# Patient Record
Sex: FEMALE | Race: WHITE
[De-identification: names, ages, dates, MRNs, and addresses within clinical notes are randomized per-mention and may not be internally consistent; named-entity substitution may affect disease eponyms.]

---

## 2019-06-24 ENCOUNTER — HOSPITAL ENCOUNTER (INPATIENT)
Dept: HOSPITAL 41 - JD.SDS | Age: 59
LOS: 1 days | Discharge: HOME | DRG: 302 | End: 2019-06-25
Attending: ORTHOPAEDIC SURGERY | Admitting: ORTHOPAEDIC SURGERY
Payer: COMMERCIAL

## 2019-06-24 DIAGNOSIS — E78.5: ICD-10-CM

## 2019-06-24 DIAGNOSIS — E11.9: ICD-10-CM

## 2019-06-24 DIAGNOSIS — Z79.82: ICD-10-CM

## 2019-06-24 DIAGNOSIS — M17.0: Primary | ICD-10-CM

## 2019-06-24 DIAGNOSIS — E03.9: ICD-10-CM

## 2019-06-24 DIAGNOSIS — Z87.891: ICD-10-CM

## 2019-06-24 DIAGNOSIS — Z90.49: ICD-10-CM

## 2019-06-24 DIAGNOSIS — Z79.84: ICD-10-CM

## 2019-06-24 DIAGNOSIS — Z79.899: ICD-10-CM

## 2019-06-24 DIAGNOSIS — H54.7: ICD-10-CM

## 2019-06-24 PROCEDURE — 3E0U33Z INTRODUCTION OF ANTI-INFLAMMATORY INTO JOINTS, PERCUTANEOUS APPROACH: ICD-10-PCS | Performed by: ORTHOPAEDIC SURGERY

## 2019-06-24 PROCEDURE — 0SRD0JA REPLACEMENT OF LEFT KNEE JOINT WITH SYNTHETIC SUBSTITUTE, UNCEMENTED, OPEN APPROACH: ICD-10-PCS | Performed by: ORTHOPAEDIC SURGERY

## 2019-06-24 PROCEDURE — C1776 JOINT DEVICE (IMPLANTABLE): HCPCS

## 2019-06-24 RX ADMIN — IODINE ONE ML: 20; 20.4; 47 LIQUID TOPICAL at 10:48

## 2019-06-24 RX ADMIN — OXYCODONE HYDROCHLORIDE AND ACETAMINOPHEN PRN TAB: 5; 325 TABLET ORAL at 18:44

## 2019-06-24 RX ADMIN — CEFUROXIME ONE MG: 750 INJECTION, POWDER, FOR SOLUTION INTRAMUSCULAR; INTRAVENOUS at 10:48

## 2019-06-24 RX ADMIN — TRIAMCINOLONE ACETONIDE ONE MG: 40 INJECTION, SUSPENSION INTRA-ARTICULAR; INTRAMUSCULAR at 11:20

## 2019-06-24 RX ADMIN — IODINE ONE ML: 20; 20.4; 47 LIQUID TOPICAL at 10:57

## 2019-06-24 RX ADMIN — DEXTROSE ONE GM: 5 SOLUTION INTRAVENOUS at 11:06

## 2019-06-24 RX ADMIN — DEXTROSE ONE GM: 5 SOLUTION INTRAVENOUS at 10:50

## 2019-06-24 RX ADMIN — TRIAMCINOLONE ACETONIDE ONE MG: 40 INJECTION, SUSPENSION INTRA-ARTICULAR; INTRAMUSCULAR at 10:49

## 2019-06-24 RX ADMIN — CEFUROXIME ONE MG: 750 INJECTION, POWDER, FOR SOLUTION INTRAMUSCULAR; INTRAVENOUS at 11:04

## 2019-06-24 RX ADMIN — OXYCODONE HYDROCHLORIDE AND ACETAMINOPHEN PRN TAB: 5; 325 TABLET ORAL at 14:34

## 2019-06-24 NOTE — PCM.CONS
H&P History of Present Illness





- General


Date of Service: 06/24/19


Admit Problem/Dx: 


 Admission Diagnosis/Problem





Admission Diagnosis/Problem      Osteoarthritis of knee








Source of Information: Patient, Old Records, Provider, RN, RN Notes Reviewed


History Limitations: Reports: No Limitations





- History of Present Illness


Initial Comments - Free Text/Narative: 


Tuyet Griffith is a 59 yo female patient of Dr. Cedeño who is post-operative day 

0 of left TKA and right knee cortisone injection. Hospital medicine was 

consulted for post-operative medical care of the following listed medical 

conditions. At this time she is resting comfortably in bed Pain is controlled. 

She denies  any chest pain, shortness of breath, palpitations, nausea, or 

vomiting. She carries a history of: Impaired vision, HLD, Osteoarthritis, Type 

II DM, Hypothyroidism. .





She is a full code. Her primary care provider is Evelia Camarena PA-C.





  ** Left Knee


Pain Score (Numeric/FACES): 4





- Related Data


Allergies/Adverse Reactions: 


 Allergies











Allergy/AdvReac Type Severity Reaction Status Date / Time


 


No Known Allergies Allergy   Verified 06/24/19 13:00











Home Medications: 


 Home Meds





Acetaminophen/Diphenhydramine [Tylenol Pm Ex-Strength Caplet] 2 tab PO BEDTIME 

PRN 06/21/19 [History]


Aspirin [Halfprin] 81 mg PO DAILY 06/21/19 [History]


Ca Carbonate/Vitamin D3/Vit K [Calcium + D Soft Chewable Tab] 1 tab PO DAILY 06/ 21/19 [History]


Cholecalciferol (Vitamin D3) [Vitamin D3] 5,000 unit PO DAILY 06/21/19 [History]


Fish Oil/Omega-3 Fatty Acids [Fish Oil 1,000 MG] 1 gm PO BID 06/21/19 [History]


Levothyroxine 150 mcg PO DAILY 06/21/19 [History]


Multivitamin [Daily Multiple Vitamin] 1 tab PO DAILY 06/21/19 [History]


Simvastatin [Zocor] 40 mg PO DAILY 06/21/19 [History]


metFORMIN HCl [Metformin HCl ER] 500 mg PO DAILY 06/21/19 [History]











Past Medical History


HEENT History: Reports: Impaired Vision, Other (See Below)


Other HEENT History: wears glasses, has partial


Cardiovascular History: Reports: High Cholesterol


Respiratory History: Reports: None


Gastrointestinal History: Reports: None


Genitourinary History: Reports: None


OB/GYN History: Reports: None


Musculoskeletal History: Reports: Osteoarthritis


Neurological History: Reports: None


Psychiatric History: Reports: None


Endocrine/Metabolic History: Reports: Diabetes, Type II, Hypothyroidism


Hematologic History: Reports: None


Immunologic History: Reports: None


Oncologic (Cancer) History: Reports: None


Dermatologic History: Reports: None





- Infectious Disease History


Infectious Disease History: Reports: None





- Past Surgical History


Head Surgeries/Procedures: Reports: None


Cardiovascular Surgical History: Reports: None


Respiratory Surgical History: Reports: None


GI Surgical History: Reports: Cholecystectomy


Female  Surgical History: Reports: None


Male  Surgical History: Reports: None


Endocrine Surgical History: Reports: None


Neurological Surgical History: Reports: None


Musculoskeletal Surgical History: Reports: Shoulder Surgery, Other (See Below)


Other Musculoskeletal Surgeries/Procedures:: knee arthroscopy, decompression of 

medial nerve


Oncologic Surgical History: Reports: None


Dermatological Surgical History: Reports: None





Social & Family History





- Family History


Family Medical History: Noncontributory





- Tobacco Use


Smoking Status *Q: Former Smoker


Years of Tobacco use: 40


Used Tobacco, but Quit: Yes


Month/Year Tobacco Last Used: Jan 2017


Second Hand Smoke Exposure: No





- Caffeine Use


Caffeine Use: Reports: Coffee


Other Caffeine Use: 1 pot of coffee a day.





- Recreational Drug Use


Recreational Drug Use: No





H&P Review of Systems





- Review of Systems:


Review Of Systems: See Below


General: Reports: No Symptoms.  Denies: Fever, Chills


HEENT: Reports: No Symptoms.  Denies: Headaches, Sore Throat


Pulmonary: Reports: No Symptoms.  Denies: Shortness of Breath, Wheezing, 

Pleuritic Chest Pain, Cough, Sputum


Cardiovascular: Reports: No Symptoms.  Denies: Chest Pain, Palpitations, 

Dyspnea on Exertion


Gastrointestinal: Reports: No Symptoms.  Denies: Abdominal Pain, Constipation, 

Diarrhea, Nausea, Vomiting


Genitourinary: Reports: No Symptoms.  Denies: Pain


Musculoskeletal: Reports: Leg Pain


Skin: Reports: No Symptoms


Psychiatric: Reports: No Symptoms.  Denies: Confusion


Neurological: Reports: No Symptoms.  Denies: Pre-Existing Deficit


Hematologic/Lymphatic: Reports: No Symptoms


Immunologic: Reports: No Symptoms





Exam





- Exam


Exam: See Below





- Vital Signs


Vital Signs: 


 Last Vital Signs











Temp  97.2 F   06/24/19 12:15


 


Pulse  55 L  06/24/19 11:45


 


Resp  13   06/24/19 12:15


 


BP  129/82   06/24/19 12:15


 


Pulse Ox  98   06/24/19 12:15











Weight: 176 lb





- Exam


Quality Assessment: DVT Prophylaxis.  No: Supplemental Oxygen, Urinary Catheter


General: Alert, Oriented, Cooperative.  No: Mild Distress


HEENT: Conjunctiva Clear, EACs Clear, EOMI, Hearing Intact, Mucosa Moist & Pink

, Nares Patent, Posterior Pharynx Clear, PERRLA


Neck: Supple, Trachea Midline


Lungs: Clear to Auscultation, Normal Respiratory Effort


Cardiovascular: Regular Rate, Regular Rhythm


GI/Abdominal Exam: Normal Bowel Sounds, Soft, Non-Tender, No Distention, No 

Abnormal Bruit


 (Female) Exam: Deferred


Rectal (Female) Exam: Deferred


Back Exam: Normal Inspection, Full Range of Motion


Extremities: No Pedal Edema, Normal Capillary Refill, Leg Pain, Limited Range 

of Motion, Other (Bandage in place on left leg. Bandage is dry and intact. 

Cooling pack in place. )


Peripheral Pulses: 2+: Radial (L), Radial (R), Dorsalis Pedis (L), Dorsalis 

Pedis (R)


Skin: Warm, Dry, Intact


Neurological: Cranial Nerves Intact (Grossly)


Neuro Extensive - Mental Status: Alert, Oriented x3





- Patient Data


Lab Results Last 24 hrs: 


 Laboratory Results - last 24 hr











  06/24/19 Range/Units





  09:05 


 


POC Glucose  117 H  ()  mg/dL














Consult PN Assessment/Plan


POD#: 0


(1) S/P total knee arthroplasty


SNOMED Code(s): 0893377676182, 687052945, 6172496169035


   Code(s): Z96.659 - PRESENCE OF UNSPECIFIED ARTIFICIAL KNEE JOINT   Priority: 

High   Current Visit: Yes   


Qualifiers: 


   Laterality: left   Qualified Code(s): Z96.652 - Presence of left artificial 

knee joint   





(2) Osteoarthritis


SNOMED Code(s): 280831326


   Code(s): M19.90 - UNSPECIFIED OSTEOARTHRITIS, UNSPECIFIED SITE   Priority: 

High   Current Visit: Yes   


Qualifiers: 


   Osteoarthritis location: knee   Osteoarthritis type: primary   Laterality: 

bilateral   Qualified Code(s): M17.0 - Bilateral primary osteoarthritis of knee

   





(3) HLD (hyperlipidemia)


SNOMED Code(s): 80875014


   Code(s): E78.5 - HYPERLIPIDEMIA, UNSPECIFIED   Priority: Low   Current Visit

: No   


Qualifiers: 


   Hyperlipidemia type: unspecified   Qualified Code(s): E78.5 - Hyperlipidemia

, unspecified   





(4) Type II diabetes mellitus


SNOMED Code(s): 18075344


   Code(s): E11.9 - TYPE 2 DIABETES MELLITUS WITHOUT COMPLICATIONS   Priority: 

Medium   Current Visit: No   


Qualifiers: 


   Diabetes mellitus long term insulin use: without long term use   Diabetes 

mellitus complication status: without complication   Qualified Code(s): E11.9 - 

Type 2 diabetes mellitus without complications   





(5) Hypothyroidism


SNOMED Code(s): 95818352


   Code(s): E03.9 - HYPOTHYROIDISM, UNSPECIFIED   Priority: Low   Current Visit

: No   


Qualifiers: 


   Hypothyroidism type: unspecified   Qualified Code(s): E03.9 - Hypothyroidism

, unspecified   


Problem List Initiated/Reviewed/Updated: Yes


Plan: 


I/P:





Acute:





S/P left total knee arthroplasty - post-operative day 0


   -DVT prophylaxis and pain management per primary care team


   -PT/OT 


   -IS/RT


   -Monitor oxygen saturation


   -Titrate oxygen as needed


   -Home medications reviewed


   -Vital signs stable 


   -Monitor labs


      -Pre-operative Hgb was 13.8


      -Pre-operative GFR was 88





Osteoarthritis of bilateral knees


   -Pain management per primary care team





S/P right knee cortisone injection


   -Management by primary team 





Chronic:





Impaired vision


HLD


Type II DM


Hypothyroidism





Plan:


CM for discharge planning


GI prophylaxis 


Home medications as indicated


Other orders as listed above 


Routine AM labs





She is a full code. Her PCP is Evelia Camarena PA-C 





Thank you for allowing us to participate in the care of this patient!! 





Requesting Provider: Dr. Cedeño 


Date Consult Requested: 06/24/19


Patient History Reviewed: Yes


Admission H&P Reviewed: Yes


Time Spent (in minutes): 35

## 2019-06-24 NOTE — PCM.SN
- Free Text/Narrative


Note: 





Left selective femoral nerve block at the adductor canal for post-operative 

pain control


Time Out: 1141


Start: 1141


End:  1200





Chart reviewed.  Consent signed.  Questions answered. Appropriate monitors 

applied.  Time out performed. Left mid-shaft femur identified via ultrasound.  

Scanning medially of left femur, identification of the femoral artery, femoral 

vein, and nerve bundles noted within the adductor canal.  The skin was prepped 

lateral to the ultrasound probe with chlorahexadine times two. The 21ga 4 

insulated block needle was inserted under direct ultrasound guidance into the 

adductor canal.  25mL of 0.5% ropivacaine with 1:200,000 epinephrine was 

injected cirmcumferentially around the nerve with intermittent negative 

aspiration noted every 5mLs. Procedure performed under aseptic technique with 

sterile probe cover, sterile gloves, and mask noted.  Patient tolerated the 

procedure well.  See pictures on progress note and vital signs on nurses 

notes.  Block completed postoperatively.  





Migue Michel CRNA

## 2019-06-24 NOTE — CR
Left knee: AP and lateral views of the left knee were obtained.

 

Comparison: No prior knee exam.

 

Knee prosthesis is seen.  Components are aligned.  Soft tissue air is 

noted from the surgical procedure.

 

Impression:

1.  Satisfactory postoperative radiographic appearance of recently 

placed left knee prosthesis.

 

Diagnostic code #2

## 2019-06-24 NOTE — PCM.OPNOTE
- General Post-Op/Procedure Note


Date of Surgery/Procedure: 06/24/19


Operative Procedure(s): left total knee with right knee corticosteroid injection


Pre Op Diagnosis: bilateral knee osteoarthritis


Post-Op Diagnosis: Same


Anesthesia Technique: Local, MAC, Spinal


Primary Surgeon: Rich Cedeño


Anesthesia Provider: Daylin Armenta


Assistant: Penny Stewart


Assistant: Saray Cruz


Complications: None


Condition: Good


Free Text/Narrative:: 





size 4/4


9mm


32x10

## 2019-06-24 NOTE — PCM.POSTAN
POST ANESTHESIA ASSESSMENT





- MENTAL STATUS


Mental Status: Alert, Oriented





- VITAL SIGNS


Pulse Rate: 80


SaO2: 97


Resp Rate: 12


Blood Pressure: 102/65


Temperature: 97.1 C





- RESPIRATORY


Respiratory Status: Respiratory Rate WNL, Airway Patent, O2 Saturation Stable





- CARDIOVASCULAR


CV Status: Pulse Rate WNL, Blood Pressure Stable





- PAIN


Pain Score: 0





- POST OP HYDRATION


Hydration Status: Adequate & Stable

## 2019-06-24 NOTE — PCM.PREANE
Preanesthetic Assessment





- Anesthesia/Transfusion/Family Hx


Anesthesia History: Prior Anesthesia Without Reaction


Family History of Anesthesia Reaction: No


Transfusion History: No Prior Transfusion(s)





- Review of Systems


General: No Symptoms


Pulmonary: No Symptoms


Cardiovascular: Dyspnea on Exertion


Gastrointestinal: No Symptoms


Neurological: Tingling (fingers at time)


Other: Reports: Diabetes





- Physical Assessment


NPO Status Date: 06/23/19


NPO Status Time: 00:00


Pulse: 77


ASA Class: 2


Mental Status: Alert & Oriented x3


Dentition: Reports: Partial, Crown(s)


Thyro-Mental Finger Breadths: 3


Mouth Opening Finger Breadths: 3


ROM/Head Extension: Full


Lungs: Clear to Auscultation, Normal Respiratory Effort


Cardiovascular: Regular Rate, Regular Rhythm





- Lab


Values: 





on chart








- Imaging/EKG


Impressions: 





on chart SR








- Allergies


Allergies/Adverse Reactions: 


 Allergies











Allergy/AdvReac Type Severity Reaction Status Date / Time


 


No Known Allergies Allergy   Verified 06/21/19 14:44














- Anesthesia Plan


Pre-Op Medication Ordered: None





- Acknowledgements


Anesthesia Type Planned: Spinal, Regional Block (left adductor canal block for 

pos-op pain control)


Pt an Appropriate Candidate for the Planned Anesthesia: Yes


Alternatives and Risks of Anesthesia Discussed w Pt/Guardian: Yes


Pt/Guardian Understands and Agrees with Anesthesia Plan: Yes





PreAnesthesia Questionnaire


HEENT History: Reports: Impaired Vision, Other (See Below)


Other HEENT History: wears glasses, has partial


Cardiovascular History: Reports: High Cholesterol


Respiratory History: Reports: None


Gastrointestinal History: Reports: None


Genitourinary History: Reports: None


OB/GYN History: Reports: None


Musculoskeletal History: Reports: Osteoarthritis


Neurological History: Reports: None


Psychiatric History: Reports: None


Endocrine/Metabolic History: Reports: Diabetes, Type II, Hypothyroidism


Hematologic History: Reports: None


Immunologic History: Reports: None


Oncologic (Cancer) History: Reports: None


Dermatologic History: Reports: None





- Past Surgical History


Head Surgeries/Procedures: Reports: None


Cardiovascular Surgical History: Reports: None


Respiratory Surgical History: Reports: None


GI Surgical History: Reports: Cholecystectomy


Female  Surgical History: Reports: None


Male  Surgical History: Reports: None


Endocrine Surgical History: Reports: None


Neurological Surgical History: Reports: None


Musculoskeletal Surgical History: Reports: Shoulder Surgery, Other (See Below)


Other Musculoskeletal Surgeries/Procedures:: knee arthroscopy, decompression of 

medial nerve


Oncologic Surgical History: Reports: None


Dermatological Surgical History: Reports: None





- SUBSTANCE USE


Smoking Status *Q: Former Smoker


Second Hand Smoke Exposure: No


Recreational Drug Use History: No





- HOME MEDS


Home Medications: 


 Home Meds





Acetaminophen/Diphenhydramine [Tylenol Pm Ex-Strength Caplet] 2 tab PO BEDTIME 

PRN 06/21/19 [History]


Aspirin [Halfprin] 81 mg PO DAILY 06/21/19 [History]


Ca Carbonate/Vitamin D3/Vit K [Calcium + D Soft Chewable Tab] 1 tab PO DAILY 06/ 21/19 [History]


Cholecalciferol (Vitamin D3) [Vitamin D3] 5,000 unit PO DAILY 06/21/19 [History]


Fish Oil/Omega-3 Fatty Acids [Fish Oil 1,000 MG] 1 gm PO BID 06/21/19 [History]


Levothyroxine 150 mcg PO DAILY 06/21/19 [History]


Multivitamin [Daily Multiple Vitamin] 1 tab PO DAILY 06/21/19 [History]


Simvastatin [Zocor] 40 mg PO DAILY 06/21/19 [History]


metFORMIN HCl [Metformin HCl ER] 500 mg PO DAILY 06/21/19 [History]











- CURRENT (IN HOUSE) MEDS


Current Meds: 





 Current Medications





Acetaminophen (Tylenol)  975 mg PO ONETIME AYAKA


   Stop: 06/24/19 15:00


Aspirin (Ecotrin)  325 mg PO BID AYAKA


Bisacodyl (Dulcolax)  5 mg PO DAILY PRN


   PRN Reason: Constipation


Cyclobenzaprine HCl (Flexeril)  10 mg PO TID PRN


   PRN Reason: Spasms


Docusate Sodium (Colace)  100 mg PO BID AYAKA


Famotidine (Pepcid)  20 mg PO Q12H AYAKA


Lactated Ringer's (Ringers, Lactated)  1,000 mls @ 125 mls/hr IV ASDIRECTED AYAKA


   Stop: 06/24/19 23:00


Cefazolin Sodium/Dextrose 2 gm (/ Premix)  50 mls @ 100 mls/hr IV Q8H AYAKA


   Stop: 06/24/19 23:59


Ketorolac Tromethamine (Toradol)  15 mg IVPUSH Q6H PRN


   PRN Reason: Pain


Lidocaine/Sodium Bicarbonate (Buffered Lidocaine 1% In Ns 8.4%)  0.25 ml IDERM 

ONETIME PRN


   PRN Reason: Prior to IV Start


   Stop: 06/24/19 18:00


Magnesium Hydroxide (Milk Of Magnesia)  30 ml PO BID PRN


   PRN Reason: Constipation


Morphine Sulfate (Morphine)  2 mg IVPUSH Q2H PRN


   PRN Reason: Breakthrough Pain


Naloxone HCl (Narcan)  0.1 mg IVPUSH Q5M PRN


   PRN Reason: Oversedation


Ondansetron HCl (Zofran)  4 mg IVPUSH Q6H PRN


   PRN Reason: Nausea/Vomiting


Oxycodone HCl (Oxycontin)  10 mg PO ONETIME AYAKA


   Stop: 06/24/19 15:00


Oxycodone/Acetaminophen (Percocet 325-5 Mg)  1 - 2 tab PO Q4H PRN


   PRN Reason: Pain


Pregabalin (Lyrica)  50 mg PO ONETIME AYAKA


   Stop: 06/24/19 15:00


Senna (Senna)  8.6 mg PO BID PRN


   PRN Reason: Constipation


Sodium Chloride (Saline Flush)  10 ml FLUSH ASDIRECTED PRN


   PRN Reason: Keep Vein Open


   Stop: 06/24/19 18:00





Discontinued Medications





Morphine Sulfate 8 mg/Epinephrine HCl 0.3 mg/Cefuroxime Sodium 750 mg/Ketorolac 

Tromethamine 30 mg/Sodium Chloride 27.9 ml  0 mg .XX ONETIME ONE


   Stop: 06/24/19 07:22

## 2019-06-25 RX ADMIN — OXYCODONE HYDROCHLORIDE AND ACETAMINOPHEN PRN TAB: 5; 325 TABLET ORAL at 11:22

## 2019-06-25 RX ADMIN — OXYCODONE HYDROCHLORIDE AND ACETAMINOPHEN PRN TAB: 5; 325 TABLET ORAL at 00:42

## 2019-06-25 RX ADMIN — OXYCODONE HYDROCHLORIDE AND ACETAMINOPHEN PRN TAB: 5; 325 TABLET ORAL at 05:59

## 2019-06-25 NOTE — PCM.CONSN
- General Info


Date of Service: 06/25/19


Admission Dx/Problem (Free Text): 


 Admission Diagnosis/Problem





Admission Diagnosis/Problem      Osteoarthritis of knee








Subjective Update: 





Tuyet Griffith is a 57 yo female patient of Dr. Cedeño who is post-operative day 

1 of left TKA and right knee cortisone injection. Hospital medicine was 

consulted for post-operative medical care of the following listed medical 

conditions. At this time she is resting comfortably in bed Pain is controlled. 

She denies  any chest pain, shortness of breath, palpitations, nausea, or 

vomiting. She carries a history of: Impaired vision, HLD, Osteoarthritis, Type 

II DM, Hypothyroidism. .


Patient had an uneventful night. No fever, chills, shortness of breath, chest 

pain, or dyspnea.


She is a full code. Her primary care provider is Evelia Camarena PA-C.








- Review of Systems


General: Reports: No Symptoms


HEENT: Reports: No Symptoms


Pulmonary: Reports: No Symptoms.  Denies: Shortness of Breath, Cough


Cardiovascular: Reports: No Symptoms.  Denies: Chest Pain


Gastrointestinal: Reports: No Symptoms


Genitourinary: Reports: No Symptoms.  Denies: Dysuria


Neurological: Reports: No Symptoms





- Patient Data


Vitals - Most Recent: 


 Last Vital Signs











Temp  99.0 F   06/25/19 11:20


 


Pulse  87   06/25/19 11:20


 


Resp  16   06/25/19 11:20


 


BP  121/84   06/25/19 11:20


 


Pulse Ox  94 L  06/25/19 11:20











Weight - Most Recent: 186 lb


I&O - Last 24 Hours: 


 Intake & Output











 06/25/19 06/25/19 06/25/19





 06:59 14:59 22:59


 


Intake Total 1650 120 


 


Output Total 2200  


 


Balance -550 120 











Lab Results Last 24 Hours: 


 Laboratory Results - last 24 hr











  06/24/19 06/25/19 06/25/19 Range/Units





  21:28 05:25 05:25 


 


WBC   8.02   (3.98-10.04)  K/mm3


 


RBC   3.72 L   (3.98-5.22)  M/mm3


 


Hgb   11.0 L   (11.2-15.7)  gm/L


 


Hct   35.1   (34.1-44.9)  %


 


MCV   94.4   (79.4-94.8)  fl


 


MCH   29.6   (25.6-32.2)  pg


 


MCHC   31.3 L   (32.2-35.5)  g/dl


 


RDW Std Deviation   48.6 H   (36.4-46.3)  fL


 


Plt Count   233   (182-369)  K/mm3


 


MPV   10.1   (9.4-12.3)  fl


 


Sodium    137  (136-145)  mEq/L


 


Potassium    4.6  (3.5-5.1)  mEq/L


 


Chloride    103  ()  mEq/L


 


Carbon Dioxide    27  (21-32)  mEq/L


 


Anion Gap    11.6  (5-15)  


 


BUN    18  (7-18)  mg/dL


 


Creatinine    1.0  (0.55-1.02)  mg/dL


 


Est Cr Clr Drug Dosing    50.73  mL/min


 


Estimated GFR (MDRD)    57  (>60)  mL/min


 


BUN/Creatinine Ratio    18.0  (14-18)  


 


Glucose    121 H  ()  mg/dL


 


POC Glucose  116 H    ()  mg/dL


 


Calcium    8.7  (8.5-10.1)  mg/dL


 


Total Bilirubin    0.3  (0.2-1.0)  mg/dL


 


AST    24  (15-37)  U/L


 


ALT    33  (14-59)  U/L


 


Alkaline Phosphatase    77  ()  U/L


 


Total Protein    6.6  (6.4-8.2)  g/dl


 


Albumin    3.1 L  (3.4-5.0)  g/dl


 


Globulin    3.5  gm/dL


 


Albumin/Globulin Ratio    0.9 L  (1-2)  














  06/25/19 06/25/19 Range/Units





  06:03 11:27 


 


WBC    (3.98-10.04)  K/mm3


 


RBC    (3.98-5.22)  M/mm3


 


Hgb    (11.2-15.7)  gm/L


 


Hct    (34.1-44.9)  %


 


MCV    (79.4-94.8)  fl


 


MCH    (25.6-32.2)  pg


 


MCHC    (32.2-35.5)  g/dl


 


RDW Std Deviation    (36.4-46.3)  fL


 


Plt Count    (182-369)  K/mm3


 


MPV    (9.4-12.3)  fl


 


Sodium    (136-145)  mEq/L


 


Potassium    (3.5-5.1)  mEq/L


 


Chloride    ()  mEq/L


 


Carbon Dioxide    (21-32)  mEq/L


 


Anion Gap    (5-15)  


 


BUN    (7-18)  mg/dL


 


Creatinine    (0.55-1.02)  mg/dL


 


Est Cr Clr Drug Dosing    mL/min


 


Estimated GFR (MDRD)    (>60)  mL/min


 


BUN/Creatinine Ratio    (14-18)  


 


Glucose    ()  mg/dL


 


POC Glucose  125 H  107 H  ()  mg/dL


 


Calcium    (8.5-10.1)  mg/dL


 


Total Bilirubin    (0.2-1.0)  mg/dL


 


AST    (15-37)  U/L


 


ALT    (14-59)  U/L


 


Alkaline Phosphatase    ()  U/L


 


Total Protein    (6.4-8.2)  g/dl


 


Albumin    (3.4-5.0)  g/dl


 


Globulin    gm/dL


 


Albumin/Globulin Ratio    (1-2)  











Med Orders - Current: 


 Current Medications








Discontinued Medications





Acetaminophen (Tylenol)  975 mg PO ONETIME [facility]


   Stop: 06/24/19 15:00


   Last Admin: 06/24/19 08:52 Dose:  975 mg


Aspirin (Ecotrin)  325 mg PO BID [facility]


   Last Admin: 06/25/19 09:32 Dose:  325 mg


Bisacodyl (Dulcolax)  5 mg PO DAILY PRN


   PRN Reason: Constipation


Bupivacaine HCl (Sensorcaine-Mpf 0.25%) Confirm Administered Dose 10 ml .ROUTE 

.STK-MED ONE


   Stop: 06/24/19 08:57


   Last Admin: 06/24/19 11:20 Dose:  4 ml


Bupivacaine HCl (Marcaine 0.25%) Confirm Administered Dose 30 ml .ROUTE .STK-

MED ONE


   Stop: 06/24/19 08:57


   Last Admin: 06/24/19 11:05 Dose:  30 ml


Cefazolin Sodium (Ancef) Confirm Administered Dose 2 gm .ROUTE .STK-MED ONE


   Stop: 06/24/19 08:57


Cefazolin Sodium (Ancef) Confirm Administered Dose 2 gm .ROUTE .STK-MED ONE


   Stop: 06/24/19 09:08


   Last Admin: 06/24/19 10:59 Dose:  2 gm


Cholecalciferol (Vitamin D3)  5,000 unit PO DAILY [facility]


   Last Admin: 06/25/19 09:33 Dose:  5,000 unit


Morphine Sulfate 8 mg/Epinephrine HCl 0.3 mg/Cefuroxime Sodium 750 mg/Ketorolac 

Tromethamine 30 mg/Sodium Chloride 27.9 ml  0 mg .XX ONETIME ONE


   Stop: 06/24/19 07:22


   Last Admin: 06/24/19 11:04 Dose:  788.3 mg


Cyclobenzaprine HCl (Flexeril)  10 mg PO TID PRN


   PRN Reason: Spasms


   Last Admin: 06/25/19 09:31 Dose:  10 mg


Diphenhydramine HCl (Benadryl)  25 mg IVPUSH Q6H PRN


   PRN Reason: pruritis


   Stop: 06/24/19 20:00


Docusate Sodium (Colace)  100 mg PO BID [facility]


   Last Admin: 06/25/19 09:32 Dose:  100 mg


Ephedrine Sulfate (Ephedrine In Ns) Confirm Administered Dose 25 mg .ROUTE .STK-

MED ONE


   Stop: 06/24/19 10:16


Epinephrine HCl (Adrenalin) Confirm Administered Dose 1 mg .ROUTE .STK-MED ONE


   Stop: 06/24/19 10:07


Famotidine (Pepcid)  20 mg PO Q12H [facility]


   Last Admin: 06/25/19 09:32 Dose:  20 mg


Fentanyl (Sublimaze) Confirm Administered Dose 100 mcg .ROUTE .STK-MED ONE


   Stop: 06/24/19 09:07


Lactated Ringer's (Ringers, Lactated)  1,000 mls @ 125 mls/hr IV ASDIRECTED [facility]


   Stop: 06/24/19 23:00


   Last Admin: 06/24/19 09:10 Dose:  125 mls/hr


Cefazolin Sodium/Dextrose 2 gm (/ Premix)  50 mls @ 100 mls/hr IV Q8H [facility]


   Stop: 06/25/19 10:29


   Last Admin: 06/25/19 09:33 Dose:  100 mls/hr


Insulin Human Lispro (Humalog)  0 unit SUBCUT QIDACANDBED [facility]; Protocol


   Last Admin: 06/25/19 13:03 Dose:  Not Given


Iodine (Iodine 2% Mild Tincture) Confirm Administered Dose 30 ml .ROUTE .STK-

MED ONE


   Stop: 06/24/19 08:57


   Last Admin: 06/24/19 10:57 Dose:  18 ml


Ketorolac Tromethamine (Toradol)  15 mg IVPUSH Q6H PRN


   PRN Reason: Pain


   Last Admin: 06/25/19 00:41 Dose:  15 mg


Levothyroxine Sodium (Levothyroxine)  150 mcg PO 0600 [facility]


   Last Admin: 06/25/19 05:59 Dose:  150 mcg


Lidocaine/Sodium Bicarbonate (Buffered Lidocaine 1% In Ns 8.4%)  0.25 ml IDERM 

ONETIME PRN


   PRN Reason: Prior to IV Start


   Stop: 06/24/19 18:00


   Last Admin: 06/24/19 09:10 Dose:  0.25 ml


Magnesium Hydroxide (Milk Of Magnesia)  30 ml PO BID PRN


   PRN Reason: Constipation


Metformin HCl (Glucophage)  250 mg PO BID [facility]


Midazolam HCl (Versed 1 Mg/Ml) Confirm Administered Dose 2 mg .ROUTE .STK-MED 

ONE


   Stop: 06/24/19 09:08


Morphine Sulfate (Morphine)  2 mg IVPUSH Q2H PRN


   PRN Reason: Breakthrough Pain


Multivitamins (Thera)  1 each PO DAILY [facility]


   Last Admin: 06/25/19 09:31 Dose:  1 each


Naloxone HCl (Narcan)  0.1 mg IVPUSH Q5M PRN


   PRN Reason: Oversedation


Ondansetron HCl (Zofran)  4 mg IVPUSH Q6H PRN


   PRN Reason: Nausea/Vomiting


Ondansetron HCl (Zofran) Confirm Administered Dose 4 mg .ROUTE .STK-MED ONE


   Stop: 06/24/19 09:07


Ondansetron HCl (Zofran)  4 mg IVPUSH ONETIME PRN


   PRN Reason: Nausea/Vomiting


   Stop: 06/24/19 20:00


Oxycodone HCl (Oxycontin)  10 mg PO ONETIME [facility]


   Stop: 06/24/19 15:00


   Last Admin: 06/24/19 08:51 Dose:  10 mg


Oxycodone/Acetaminophen (Percocet 325-5 Mg)  1 - 2 tab PO Q4H PRN


   PRN Reason: Pain


   Last Admin: 06/25/19 11:22 Dose:  2 tab


Pregabalin (Lyrica)  50 mg PO ONETIME [facility]


   Stop: 06/24/19 15:00


   Last Admin: 06/24/19 08:51 Dose:  50 mg


Propofol (Diprivan  20 Ml) Confirm Administered Dose 200 mg .ROUTE .STK-MED ONE


   Stop: 06/24/19 09:07


Propofol (Diprivan  20 Ml) Confirm Administered Dose 200 mg .ROUTE .STK-MED ONE


   Stop: 06/24/19 10:23


Propofol (Diprivan  20 Ml) Confirm Administered Dose 200 mg .ROUTE .STK-MED ONE


   Stop: 06/24/19 11:04


Ropivacaine (Naropin 0.5%) Confirm Administered Dose 30 ml .ROUTE .STK-MED ONE


   Stop: 06/24/19 10:07


Senna (Senna)  8.6 mg PO BID PRN


   PRN Reason: Constipation


Simvastatin (Zocor)  40 mg PO DAILY [facility]


Sodium Chloride (Saline Flush)  10 ml FLUSH ASDIRECTED PRN


   PRN Reason: Keep Vein Open


   Stop: 06/24/19 18:00


Tranexamic Acid (Cyklokapron) Confirm Administered Dose 1,000 mg .ROUTE .STK-

MED ONE


   Stop: 06/24/19 08:57


   Last Admin: 06/24/19 11:12 Dose:  1,000 mg


Triamcinolone Acetonide (Kenalog-40) Confirm Administered Dose 80 mg .ROUTE .STK

-MED ONE


   Stop: 06/24/19 08:57


   Last Admin: 06/24/19 11:20 Dose:  80 mg


Vancomycin HCl (Vancomycin) Confirm Administered Dose 1 gm .ROUTE .STK-MED ONE


   Stop: 06/24/19 08:57


   Last Admin: 06/24/19 11:06 Dose:  1 gm











- Exam


Quality Assessment: No: Supplemental Oxygen


General: Alert, Oriented


HEENT: Pupils Equal


Neck: Supple


Lungs: Clear to Auscultation, Normal Respiratory Effort


Cardiovascular: Regular Rate, Regular Rhythm


GI/Abdominal Exam: Normal Bowel Sounds, Soft, Non-Tender, No Organomegaly, No 

Distention


Extremities: Normal Inspection, Normal Range of Motion, Non-Tender, No Pedal 

Edema


Skin: Warm, Dry, Intact


Wound/Incisions: Healing Well


Neurological: No New Focal Deficit


Psy/Mental Status: Alert, Normal Affect, Normal Mood





Consult PN Assessment/Plan


POD#: 1


Problem List Initiated/Reviewed/Updated: Yes


Plan: 








Acute:





S/P left total knee arthroplasty - post-operative day 1


   -DVT prophylaxis and pain management per primary care team


   -PT/OT 


   -IS/RT


   -Monitor oxygen saturation


   -Titrate oxygen as needed


   -Home medications reviewed


   -Vital signs stable 


   -Monitor labs


      -Pre-operative Hgb was 13.8


      -Pre-operative GFR was 88


      -Postop day 1 hemoglobin 11.8, BUN 18, creatinine 1.0





Osteoarthritis of bilateral knees


   -Pain management per primary care team





S/P right knee cortisone injection


   -Management by primary team 





Chronic:





Impaired vision


HLD


Type II DM


Hypothyroidism





Plan:


Medically stable for discharge.





She is a full code. Her PCP is Evelia Camarena PA-C 





Thank you for allowing us to participate in the care of this patient!!

## 2019-06-25 NOTE — [id number]
- General Info


Date of Service: 06/25/19


POD#: 1


Functional Status: Reports: Pain Controlled, Tolerating Diet, Ambulating, 

Urinating, Incentive Spirometry





- Review of Systems


Musculoskeletal: Reports: Other (The pt has progressed well with therapies.)





- Patient Data


Vitals - Most Recent: 


 Last Vital Signs











Temp  99.1 F   06/25/19 07:33


 


Pulse  85   06/25/19 07:33


 


Resp  16   06/25/19 07:33


 


BP  103/71   06/25/19 07:33


 


Pulse Ox  94 L  06/25/19 07:33











Weight - Most Recent: 186 lb


I&O - Last 24 Hours: 


 Intake & Output











 06/24/19 06/25/19 06/25/19





 22:59 06:59 14:59


 


Intake Total 1400 1650 


 


Output Total 200 2200 


 


Balance 1200 -550 











Lab Results Last 24 Hrs: 


 Laboratory Results - last 24 hr











  06/24/19 06/24/19 06/25/19 Range/Units





  09:05 21:28 05:25 


 


WBC    8.02  (3.98-10.04)  K/mm3


 


RBC    3.72 L  (3.98-5.22)  M/mm3


 


Hgb    11.0 L  (11.2-15.7)  gm/L


 


Hct    35.1  (34.1-44.9)  %


 


MCV    94.4  (79.4-94.8)  fl


 


MCH    29.6  (25.6-32.2)  pg


 


MCHC    31.3 L  (32.2-35.5)  g/dl


 


RDW Std Deviation    48.6 H  (36.4-46.3)  fL


 


Plt Count    233  (182-369)  K/mm3


 


MPV    10.1  (9.4-12.3)  fl


 


Sodium     (136-145)  mEq/L


 


Potassium     (3.5-5.1)  mEq/L


 


Chloride     ()  mEq/L


 


Carbon Dioxide     (21-32)  mEq/L


 


Anion Gap     (5-15)  


 


BUN     (7-18)  mg/dL


 


Creatinine     (0.55-1.02)  mg/dL


 


Est Cr Clr Drug Dosing     mL/min


 


Estimated GFR (MDRD)     (>60)  mL/min


 


BUN/Creatinine Ratio     (14-18)  


 


Glucose     ()  mg/dL


 


POC Glucose  117 H  116 H   ()  mg/dL


 


Calcium     (8.5-10.1)  mg/dL


 


Total Bilirubin     (0.2-1.0)  mg/dL


 


AST     (15-37)  U/L


 


ALT     (14-59)  U/L


 


Alkaline Phosphatase     ()  U/L


 


Total Protein     (6.4-8.2)  g/dl


 


Albumin     (3.4-5.0)  g/dl


 


Globulin     gm/dL


 


Albumin/Globulin Ratio     (1-2)  














  06/25/19 06/25/19 Range/Units





  05:25 06:03 


 


WBC    (3.98-10.04)  K/mm3


 


RBC    (3.98-5.22)  M/mm3


 


Hgb    (11.2-15.7)  gm/L


 


Hct    (34.1-44.9)  %


 


MCV    (79.4-94.8)  fl


 


MCH    (25.6-32.2)  pg


 


MCHC    (32.2-35.5)  g/dl


 


RDW Std Deviation    (36.4-46.3)  fL


 


Plt Count    (182-369)  K/mm3


 


MPV    (9.4-12.3)  fl


 


Sodium  137   (136-145)  mEq/L


 


Potassium  4.6   (3.5-5.1)  mEq/L


 


Chloride  103   ()  mEq/L


 


Carbon Dioxide  27   (21-32)  mEq/L


 


Anion Gap  11.6   (5-15)  


 


BUN  18   (7-18)  mg/dL


 


Creatinine  1.0   (0.55-1.02)  mg/dL


 


Est Cr Clr Drug Dosing  50.73   mL/min


 


Estimated GFR (MDRD)  57   (>60)  mL/min


 


BUN/Creatinine Ratio  18.0   (14-18)  


 


Glucose  121 H   ()  mg/dL


 


POC Glucose   125 H  ()  mg/dL


 


Calcium  8.7   (8.5-10.1)  mg/dL


 


Total Bilirubin  0.3   (0.2-1.0)  mg/dL


 


AST  24   (15-37)  U/L


 


ALT  33   (14-59)  U/L


 


Alkaline Phosphatase  77   ()  U/L


 


Total Protein  6.6   (6.4-8.2)  g/dl


 


Albumin  3.1 L   (3.4-5.0)  g/dl


 


Globulin  3.5   gm/dL


 


Albumin/Globulin Ratio  0.9 L   (1-2)  











Med Orders - Current: 


 Current Medications





Aspirin (Ecotrin)  325 mg PO BID [facility]


Bisacodyl (Dulcolax)  5 mg PO DAILY PRN


   PRN Reason: Constipation


Cholecalciferol (Vitamin D3)  5,000 unit PO DAILY [facility]


Cyclobenzaprine HCl (Flexeril)  10 mg PO TID PRN


   PRN Reason: Spasms


Docusate Sodium (Colace)  100 mg PO BID [facility]


   Last Admin: 06/24/19 21:29 Dose:  100 mg


Famotidine (Pepcid)  20 mg PO Q12H [facility]


   Last Admin: 06/24/19 21:29 Dose:  20 mg


Cefazolin Sodium/Dextrose 2 gm (/ Premix)  50 mls @ 100 mls/hr IV Q8H [facility]


   Stop: 06/25/19 10:29


   Last Admin: 06/25/19 01:01 Dose:  100 mls/hr


Insulin Human Lispro (Humalog)  0 unit SUBCUT QIDACANDBED [facility]; Protocol


   Last Admin: 06/25/19 06:05 Dose:  Not Given


Ketorolac Tromethamine (Toradol)  15 mg IVPUSH Q6H PRN


   PRN Reason: Pain


   Last Admin: 06/25/19 00:41 Dose:  15 mg


Levothyroxine Sodium (Levothyroxine)  150 mcg PO 0600 [facility]


   Last Admin: 06/25/19 05:59 Dose:  150 mcg


Magnesium Hydroxide (Milk Of Magnesia)  30 ml PO BID PRN


   PRN Reason: Constipation


Morphine Sulfate (Morphine)  2 mg IVPUSH Q2H PRN


   PRN Reason: Breakthrough Pain


Multivitamins (Thera)  1 each PO DAILY [facility]


Naloxone HCl (Narcan)  0.1 mg IVPUSH Q5M PRN


   PRN Reason: Oversedation


Ondansetron HCl (Zofran)  4 mg IVPUSH Q6H PRN


   PRN Reason: Nausea/Vomiting


Oxycodone/Acetaminophen (Percocet 325-5 Mg)  1 - 2 tab PO Q4H PRN


   PRN Reason: Pain


   Last Admin: 06/25/19 05:59 Dose:  2 tab


Senna (Senna)  8.6 mg PO BID PRN


   PRN Reason: Constipation





Discontinued Medications





Acetaminophen (Tylenol)  975 mg PO ONETIME [facility]


   Stop: 06/24/19 15:00


   Last Admin: 06/24/19 08:52 Dose:  975 mg


Bupivacaine HCl (Sensorcaine-Mpf 0.25%) Confirm Administered Dose 10 ml .ROUTE 

.STK-MED ONE


   Stop: 06/24/19 08:57


   Last Admin: 06/24/19 10:46 Dose:  4 ml


Bupivacaine HCl (Marcaine 0.25%) Confirm Administered Dose 30 ml .ROUTE .STK-

MED ONE


   Stop: 06/24/19 08:57


   Last Admin: 06/24/19 10:47 Dose:  30 ml


Cefazolin Sodium (Ancef) Confirm Administered Dose 2 gm .ROUTE .STK-MED ONE


   Stop: 06/24/19 08:57


Cefazolin Sodium (Ancef) Confirm Administered Dose 2 gm .ROUTE .STK-MED ONE


   Stop: 06/24/19 09:08


   Last Admin: 06/24/19 10:47 Dose:  2 gm


Morphine Sulfate 8 mg/Epinephrine HCl 0.3 mg/Cefuroxime Sodium 750 mg/Ketorolac 

Tromethamine 30 mg/Sodium Chloride 27.9 ml  0 mg .XX ONETIME ONE


   Stop: 06/24/19 07:22


   Last Admin: 06/24/19 10:48 Dose:  788.3 mg


Diphenhydramine HCl (Benadryl)  25 mg IVPUSH Q6H PRN


   PRN Reason: pruritis


   Stop: 06/24/19 20:00


Ephedrine Sulfate (Ephedrine In Ns) Confirm Administered Dose 25 mg .ROUTE .STK-

MED ONE


   Stop: 06/24/19 10:16


Epinephrine HCl (Adrenalin) Confirm Administered Dose 1 mg .ROUTE .STK-MED ONE


   Stop: 06/24/19 10:07


Fentanyl (Sublimaze) Confirm Administered Dose 100 mcg .ROUTE .STK-MED ONE


   Stop: 06/24/19 09:07


Lactated Ringer's (Ringers, Lactated)  1,000 mls @ 125 mls/hr IV ASDIRECTED [facility]A


   Stop: 06/24/19 23:00


   Last Admin: 06/24/19 09:10 Dose:  125 mls/hr


Iodine (Iodine 2% Mild Tincture) Confirm Administered Dose 30 ml .ROUTE .STK-

MED ONE


   Stop: 06/24/19 08:57


   Last Admin: 06/24/19 10:48 Dose:  18 ml


Lidocaine/Sodium Bicarbonate (Buffered Lidocaine 1% In Ns 8.4%)  0.25 ml IDERM 

ONETIME PRN


   PRN Reason: Prior to IV Start


   Stop: 06/24/19 18:00


   Last Admin: 06/24/19 09:10 Dose:  0.25 ml


Metformin HCl (Glucophage)  250 mg PO BID [facility]


Midazolam HCl (Versed 1 Mg/Ml) Confirm Administered Dose 2 mg .ROUTE .STK-MED 

ONE


   Stop: 06/24/19 09:08


Ondansetron HCl (Zofran) Confirm Administered Dose 4 mg .ROUTE .STK-MED ONE


   Stop: 06/24/19 09:07


Ondansetron HCl (Zofran)  4 mg IVPUSH ONETIME PRN


   PRN Reason: Nausea/Vomiting


   Stop: 06/24/19 20:00


Oxycodone HCl (Oxycontin)  10 mg PO ONETIME [facility]


   Stop: 06/24/19 15:00


   Last Admin: 06/24/19 08:51 Dose:  10 mg


Pregabalin (Lyrica)  50 mg PO ONETIME [facility]


   Stop: 06/24/19 15:00


   Last Admin: 06/24/19 08:51 Dose:  50 mg


Propofol (Diprivan  20 Ml) Confirm Administered Dose 200 mg .ROUTE .STK-MED ONE


   Stop: 06/24/19 09:07


Propofol (Diprivan  20 Ml) Confirm Administered Dose 200 mg .ROUTE .STK-MED ONE


   Stop: 06/24/19 10:23


Propofol (Diprivan  20 Ml) Confirm Administered Dose 200 mg .ROUTE .STK-MED ONE


   Stop: 06/24/19 11:04


Ropivacaine (Naropin 0.5%) Confirm Administered Dose 30 ml .ROUTE .STK-MED ONE


   Stop: 06/24/19 10:07


Simvastatin (Zocor)  40 mg PO DAILY [facility]


Sodium Chloride (Saline Flush)  10 ml FLUSH ASDIRECTED PRN


   PRN Reason: Keep Vein Open


   Stop: 06/24/19 18:00


Tranexamic Acid (Cyklokapron) Confirm Administered Dose 1,000 mg .ROUTE .STK-

MED ONE


   Stop: 06/24/19 08:57


   Last Admin: 06/24/19 10:49 Dose:  1,000 mg


Triamcinolone Acetonide (Kenalog-40) Confirm Administered Dose 80 mg .ROUTE .STK

-MED ONE


   Stop: 06/24/19 08:57


   Last Admin: 06/24/19 10:49 Dose:  80 mg


Vancomycin HCl (Vancomycin) Confirm Administered Dose 1 gm .ROUTE .STK-MED ONE


   Stop: 06/24/19 08:57


   Last Admin: 06/24/19 10:50 Dose:  1 gm











- Exam


Wound/Incisions: Dressing Dry and Intact


General: Alert, Cooperative, No Acute Distress


Lungs: Normal Respiratory Effort


Extremities: Other (NVS intact for LLE.  Sahil's negative.)





- Problem List Review


Problem List Initiated/Reviewed/Updated: Yes





- My Orders


Last 24 Hours: 


 Active Orders 24 hr











 Category Date Time Status


 


 Patient Status [ADT] Routine ADT  06/24/19 07:22 Active


 


 Antiembolic Devices [RC] 09,21 Care  06/24/19 07:22 Active


 


 Blood Glucose Check, Bedside [RC] QIDACANDBED Care  06/24/19 16:51 Active


 


 Notify Provider [RC] 09,21 Care  06/24/19 10:51 Active


 


 RT Incentive Spirometry [RC] Q1HWA Care  06/24/19 07:21 Active


 


 Ready for Discharge [RC] PER UNIT ROUTINE Care  06/25/19 08:15 Ordered


 


 Vital Signs [RC] Q4HR Care  06/24/19 07:22 Active


 


 Consult to Physician [CONS] Routine Cons  06/24/19 07:22 Active


 


 OT Evaluation and Treatment [CONS] Routine Cons  06/24/19 07:21 Active


 


 PT Evaluation and Treatment [CONS] Routine Cons  06/24/19 07:21 Active


 


 [employer] Diet [DIET] Diet  06/24/19 Lunch Active


 


 Acetaminophen/oxyCODONE [Percocet 325-5 MG] Med  06/24/19 07:21 Active





 1 - 2 tab PO Q4H PRN   


 


 Aspirin [Ecotrin] Med  06/25/19 09:00 Active





 325 mg PO BID   


 


 Bisacodyl [Dulcolax] Med  06/24/19 07:22 Active





 5 mg PO DAILY PRN   


 


 Cholecalciferol (Vitamin D3) [Vitamin D3] Med  06/25/19 09:00 Active





 5,000 unit PO DAILY   


 


 Cyclobenzaprine [Flexeril] Med  06/24/19 15:00 Active





 10 mg PO TID PRN   


 


 Docusate Sodium [Colace] Med  06/24/19 21:00 Active





 100 mg PO BID   


 


 Famotidine [Pepcid] Med  06/24/19 21:00 Active





 20 mg PO Q12H   


 


 Insulin Lispro [HumaLOG] Med  06/24/19 17:00 Active





 See Protocol  SUBCUT QIDACANDBED   


 


 Ketorolac [Toradol] Med  06/24/19 18:00 Active





 15 mg IVPUSH Q6H PRN   


 


 Levothyroxine Med  06/25/19 06:00 Active





 150 mcg PO 0600   


 


 Magnesium Hydroxide [Milk of Magnesia] Med  06/24/19 21:00 Active





 30 ml PO BID PRN   


 


 Morphine Med  06/24/19 07:22 Active





 2 mg IVPUSH Q2H PRN   


 


 Multivitamins,Therapeutic [Thera] Med  06/25/19 09:00 Active





 1 each PO DAILY   


 


 Naloxone [Narcan] Med  06/24/19 07:22 Active





 0.1 mg IVPUSH Q5M PRN   


 


 Ondansetron [Zofran] Med  06/24/19 07:22 Active





 4 mg IVPUSH Q6H PRN   


 


 Sennosides [Senna] Med  06/24/19 21:00 Active





 8.6 mg PO BID PRN   


 


 ceFAZolin [Ancef] 2 gm Med  06/24/19 18:00 Active





 Premix Bag 1 bag   





 IV Q8H   


 


 Antiembolic Hose [OM.PC] Per Unit Routine Oth  06/24/19 07:23 Ordered


 


 Ice Therapy [OM.PC] Per Unit Routine Oth  06/24/19 07:23 Ordered


 


 Sequential Compression Device [OM.PC] Per Unit Routine Oth  06/24/19 07:21 

Ordered


 


 Resuscitation Status Routine Resus Stat  06/24/19 07:22 Ordered








 Medication Orders





Aspirin (Ecotrin)  325 mg PO BID [facility]


Bisacodyl (Dulcolax)  5 mg PO DAILY PRN


   PRN Reason: Constipation


Cholecalciferol (Vitamin D3)  5,000 unit PO DAILY [facility]


Cyclobenzaprine HCl (Flexeril)  10 mg PO TID PRN


   PRN Reason: Spasms


Docusate Sodium (Colace)  100 mg PO BID [facility]


   Last Admin: 06/24/19 21:29  Dose: 100 mg


Famotidine (Pepcid)  20 mg PO Q12H [facility]


   Last Admin: 06/24/19 21:29  Dose: 20 mg


Cefazolin Sodium/Dextrose 2 gm (/ Premix)  50 mls @ 100 mls/hr IV Q8H [facility]


   Stop: 06/25/19 10:29


   Last Admin: 06/25/19 01:01  Dose: 100 mls/hr


   Infusion: 06/24/19 19:14  Dose: 100 mls/hr


   Admin: 06/24/19 18:44  Dose: 100 mls/hr


Insulin Human Lispro (Humalog)  0 unit SUBCUT QIDACANDBED [facility]; Protocol


   Last Admin: 06/25/19 06:05  Dose:  


   Admin: 06/24/19 21:30  Dose:  


   Admin: 06/24/19 18:45 Dose:  Not Given


Ketorolac Tromethamine (Toradol)  15 mg IVPUSH Q6H PRN


   PRN Reason: Pain


   Last Admin: 06/25/19 00:41  Dose: 15 mg


Levothyroxine Sodium (Levothyroxine)  150 mcg PO 0600 [facility]


   Last Admin: 06/25/19 05:59  Dose: 150 mcg


Magnesium Hydroxide (Milk Of Magnesia)  30 ml PO BID PRN


   PRN Reason: Constipation


Morphine Sulfate (Morphine)  2 mg IVPUSH Q2H PRN


   PRN Reason: Breakthrough Pain


Multivitamins (Thera)  1 each PO DAILY [facility]


Naloxone HCl (Narcan)  0.1 mg IVPUSH Q5M PRN


   PRN Reason: Oversedation


Ondansetron HCl (Zofran)  4 mg IVPUSH Q6H PRN


   PRN Reason: Nausea/Vomiting


Oxycodone/Acetaminophen (Percocet 325-5 Mg)  1 - 2 tab PO Q4H PRN


   PRN Reason: Pain


   Last Admin: 06/25/19 05:59  Dose: 2 tab


   Admin: 06/25/19 00:42  Dose: 2 tab


   Admin: 06/24/19 18:44  Dose: 2 tab


   Admin: 06/24/19 14:34  Dose: 2 tab


Senna (Senna)  8.6 mg PO BID PRN


   PRN Reason: Constipation











- Assessment


Assessment           (Free Text/Narrative):: 





POD#1 - left TKA with right knee cortisone injection





- Plan


Plan                        (Free Text/Narrative):: 





1.  Hgb 11.0.


2.  Discharge to home today.


3.  Outpatient therapy.


4.  325mg ASA PO BID, frequent mobility, TEDs.





The pt's case was discussed with Dr. Cedeño.

## 2019-06-26 NOTE — PCM.DCSUM1
**Discharge Summary





- Hospital Course


Brief History: Lv is a 57 yo female who underwent left TKA with right knee 

cortisone injection with Dr. Cedeño on 6-.  The procedure was completed 

under spinal anesthesia with MAC.  The pt tolerated the procedure well and was 

admitted to the Medical-Surgical Unit.  The pt received Ancef fei-operatively.

  She participated in P.T. and O.T. and progressed well.  She was allowed to 

WBAT and used a FWW for mobility.  The pt's surgical wound was dressed with a 

Mepilex dressing and remained clean and dry.  On POD#1, the pt was started on 

325mg ASA BID for VTE prophylaxis.  The pt used TEDs and SCDs also.  On POD#1, 

the pt's hemoglobin was 11.0.  Medical management was provided by the 

Hospitalist service and the pt's hospital course was uneventful.  On POD#1, the 

pt was deemed appropriate for discharge to home with family.





- Discharge Data


Discharge Date: 06/25/19


Discharge Disposition: Home, Self-Care 01


Condition: Good





- Patient Summary/Data


Operative Procedure(s) Performed: left total knee with right knee 

corticosteroid injection


Consults: 


 Consultations





06/24/19 07:21


OT Evaluation and Treatment [CONS] Routine 


PT Evaluation and Treatment [CONS] Routine 





06/24/19 07:22


Consult to Physician [CONS] Routine 














- Patient Instructions


Diet: Usual Diet as Tolerated


Activity: Apply Ice, As Tolerated, Elevate Extremity, Full Weight Bearing


Driving: Do Not Drive


Showering/Bathing: May Shower


Wound/Incision Care: Keep Operative Site/Wound Site Clean and Dry, Do NOT 

Change Dressing


Notify Provider of: Fever, Increased Pain, Swelling and Redness, Drainage, 

Nausea and/or Vomiting


Other/Special Instructions: Please get up and moving around EVERY HOUR while 

awake.  This helps to prevent blood clots.  Please use your walker and have 

help with mobility as needed.  Take a short walk in your home every hour while 

awake.  Please take 325mg Aspirin TWICE daily.  The aspirin is being used for 

blood clot prevention and not for pain management so please do not miss a dose 

of the medication.  You could use a medication like Zantac or Pepcid and a 

medication like Prilosec or Nexium to protect your stomach while you are using 

the aspirin.  At home, please complete the exercises that you learned during 

the Hospital stay.  Schedule for physical therapy.  Use the pain medication as 

needed.  The medication may cause drowsiness and constipation.  Contact your 

primary care provider for instructions if you are constipated.  You may use a 

stool softener like docusate sodium or Colace 100mg twice daily and/or a 

laxative like Miralax daily for constipation.  Increase your water and fiber 

intake while you are using the pain medication.  Discontinue use of the pain 

medication as soon as able.  Please do not use other medications that may cause 

drowsiness (other pain medications, anxiety pills, cold medications, sleeping 

pills, etc) while using the prescription pain medication.  Do not use alcohol 

while using the pain medication.  You may use acetaminophen or Tylenol for pain 

management, however, please ensure you are not using over 4000 mg or 4 grams of 

acetaminophen per day from all sources.  Your pain medication has 325mg of 

acetaminophen per tablet.  At this time, please do not use ibuprofen (Motrin, 

Advil) or naproxen (Aleve) for pain management as you are using the aspirin.  

When the aspirin course is completed in 4 to 6 weeks, you could use ibuprofen 

or naproxen for pain management (if this is allowed by your primary care 

provider).  Wear the DARYA hose during the day and you may remove these at night.

  Elevate the limb to decrease swelling.  Place ice to the area often.  Place a 

towel between your skin and the blue pad.  Use the incentive spirometer often.  

Take deep breaths throughout the day.  Please keep the dressing in place until 

follow-up.  Notify the Clinic if the dressing becomes saturated.  Increase your 

protein intake while you are healing.  If you have diabetes, please closely 

monitor your blood sugars and notify your primary care provider with abnormal 

values.  Elevated blood sugars increases the risk of infection.  Call the 

Clinic with questions or concerns - [phone].





- Discharge Plan


*PRESCRIPTION DRUG MONITORING PROGRAM REVIEWED*: No


*COPY OF PRESCRIPTION DRUG MONITORING REPORT IN PATIENT JOELLEN: No


Prescriptions/Med Rec: 


Acetaminophen/oxyCODONE [Percocet 325-5 MG] 1 - 2 tab PO Q4H PRN #60 tablet


 PRN Reason: Pain


Aspirin [Ecotrin EC] 325 mg PO BID #84 tab.ec


Cyclobenzaprine [Flexeril] 10 mg PO BID PRN #30 tablet


 PRN Reason: Spasms


Home Medications: 


 Home Meds





Cholecalciferol (Vitamin D3) [Vitamin D3] 5,000 unit PO DAILY 06/21/19 [History]


Levothyroxine 150 mcg PO DAILY 06/21/19 [History]


Multivitamin [Daily Multiple Vitamin] 1 tab PO DAILY 06/21/19 [History]


Simvastatin [Zocor] 40 mg PO DAILY 06/21/19 [History]


metFORMIN HCl [Metformin HCl ER] 500 mg PO DAILY 06/21/19 [History]


Acetaminophen/oxyCODONE [Percocet 325-5 MG] 1 - 2 tab PO Q4H PRN #60 tablet 06/ 25/19 [Rx]


Aspirin [Ecotrin EC] 325 mg PO BID #84 tab.ec 06/25/19 [Rx]


Bisacodyl [Dulcolax] 5 mg PO DAILY PRN  tablet 06/25/19 [Rx]


Cyclobenzaprine [Flexeril] 10 mg PO BID PRN #30 tablet 06/25/19 [Rx]


Docusate Sodium [Colace] 100 mg PO BID  cap 06/25/19 [Rx]


Famotidine [Pepcid] 20 mg PO Q12H  tablet 06/25/19 [Rx]


Magnesium Hydroxide [Milk of Magnesia] 30 ml PO BID PRN  cup 06/25/19 [Rx]


Sennosides [Senna] 8.6 mg PO BID PRN  tablet 06/25/19 [Rx]








Patient Handouts:  Total Knee Replacement, Care After, Easy-to-Read


Referrals: 


Penny Stewart PA-C [Physician Assistant] - 07/02/19 11:15 am (your follow up 

appointments are with Penny on 7/2/19 at 11:15 am and 7/9/18 at 11:15 am. )





- Discharge Summary/Plan Comment


DC Time >30 min.: No





- Patient Data


Vitals - Most Recent: 


 Last Vital Signs











Temp  99.0 F   06/25/19 11:20


 


Pulse  87   06/25/19 11:20


 


Resp  16   06/25/19 11:20


 


BP  121/84   06/25/19 11:20


 


Pulse Ox  94 L  06/25/19 11:20











Weight - Most Recent: 186 lb


Lab Results - Last 24 hrs: 


 Laboratory Results - last 24 hr











  06/25/19 Range/Units





  11:27 


 


POC Glucose  107 H  ()  mg/dL











Med Orders - Current: 


 Current Medications








Discontinued Medications





Acetaminophen (Tylenol)  975 mg PO ONETIME [facility]


   Stop: 06/24/19 15:00


   Last Admin: 06/24/19 08:52 Dose:  975 mg


Aspirin (Ecotrin)  325 mg PO BID [facility]


   Last Admin: 06/25/19 09:32 Dose:  325 mg


Bisacodyl (Dulcolax)  5 mg PO DAILY PRN


   PRN Reason: Constipation


Bupivacaine HCl (Sensorcaine-Mpf 0.25%) Confirm Administered Dose 10 ml .ROUTE 

.STK-MED ONE


   Stop: 06/24/19 08:57


   Last Admin: 06/24/19 11:20 Dose:  4 ml


Bupivacaine HCl (Marcaine 0.25%) Confirm Administered Dose 30 ml .ROUTE .STK-

MED ONE


   Stop: 06/24/19 08:57


   Last Admin: 06/24/19 11:05 Dose:  30 ml


Cefazolin Sodium (Ancef) Confirm Administered Dose 2 gm .ROUTE .STK-MED ONE


   Stop: 06/24/19 08:57


Cefazolin Sodium (Ancef) Confirm Administered Dose 2 gm .ROUTE .STK-MED ONE


   Stop: 06/24/19 09:08


   Last Admin: 06/24/19 10:59 Dose:  2 gm


Cholecalciferol (Vitamin D3)  5,000 unit PO DAILY [facility]


   Last Admin: 06/25/19 09:33 Dose:  5,000 unit


Morphine Sulfate 8 mg/Epinephrine HCl 0.3 mg/Cefuroxime Sodium 750 mg/Ketorolac 

Tromethamine 30 mg/Sodium Chloride 27.9 ml  0 mg .XX ONETIME ONE


   Stop: 06/24/19 07:22


   Last Admin: 06/24/19 11:04 Dose:  788.3 mg


Cyclobenzaprine HCl (Flexeril)  10 mg PO TID PRN


   PRN Reason: Spasms


   Last Admin: 06/25/19 09:31 Dose:  10 mg


Diphenhydramine HCl (Benadryl)  25 mg IVPUSH Q6H PRN


   PRN Reason: pruritis


   Stop: 06/24/19 20:00


Docusate Sodium (Colace)  100 mg PO BID [facility]


   Last Admin: 06/25/19 09:32 Dose:  100 mg


Ephedrine Sulfate (Ephedrine In Ns) Confirm Administered Dose 25 mg .ROUTE .STK-

MED ONE


   Stop: 06/24/19 10:16


Epinephrine HCl (Adrenalin) Confirm Administered Dose 1 mg .ROUTE .STK-MED ONE


   Stop: 06/24/19 10:07


Famotidine (Pepcid)  20 mg PO Q12H [facility]


   Last Admin: 06/25/19 09:32 Dose:  20 mg


Fentanyl (Sublimaze) Confirm Administered Dose 100 mcg .ROUTE .STK-MED ONE


   Stop: 06/24/19 09:07


Lactated Ringer's (Ringers, Lactated)  1,000 mls @ 125 mls/hr IV ASDIRECTED [facility]


   Stop: 06/24/19 23:00


   Last Admin: 06/24/19 09:10 Dose:  125 mls/hr


Cefazolin Sodium/Dextrose 2 gm (/ Premix)  50 mls @ 100 mls/hr IV Q8H [facility]


   Stop: 06/25/19 10:29


   Last Admin: 06/25/19 09:33 Dose:  100 mls/hr


Insulin Human Lispro (Humalog)  0 unit SUBCUT QIDACANDBED [facility]; Protocol


   Last Admin: 06/25/19 13:03 Dose:  Not Given


Iodine (Iodine 2% Mild Tincture) Confirm Administered Dose 30 ml .ROUTE .STK-

MED ONE


   Stop: 06/24/19 08:57


   Last Admin: 06/24/19 10:57 Dose:  18 ml


Ketorolac Tromethamine (Toradol)  15 mg IVPUSH Q6H PRN


   PRN Reason: Pain


   Last Admin: 06/25/19 00:41 Dose:  15 mg


Levothyroxine Sodium (Levothyroxine)  150 mcg PO 0600 [facility]


   Last Admin: 06/25/19 05:59 Dose:  150 mcg


Lidocaine/Sodium Bicarbonate (Buffered Lidocaine 1% In Ns 8.4%)  0.25 ml IDERM 

ONETIME PRN


   PRN Reason: Prior to IV Start


   Stop: 06/24/19 18:00


   Last Admin: 06/24/19 09:10 Dose:  0.25 ml


Magnesium Hydroxide (Milk Of Magnesia)  30 ml PO BID PRN


   PRN Reason: Constipation


Metformin HCl (Glucophage)  250 mg PO BID [facility]


Midazolam HCl (Versed 1 Mg/Ml) Confirm Administered Dose 2 mg .ROUTE .STK-MED 

ONE


   Stop: 06/24/19 09:08


Morphine Sulfate (Morphine)  2 mg IVPUSH Q2H PRN


   PRN Reason: Breakthrough Pain


Multivitamins (Thera)  1 each PO DAILY [facility]


   Last Admin: 06/25/19 09:31 Dose:  1 each


Naloxone HCl (Narcan)  0.1 mg IVPUSH Q5M PRN


   PRN Reason: Oversedation


Ondansetron HCl (Zofran)  4 mg IVPUSH Q6H PRN


   PRN Reason: Nausea/Vomiting


Ondansetron HCl (Zofran) Confirm Administered Dose 4 mg .ROUTE .STK-MED ONE


   Stop: 06/24/19 09:07


Ondansetron HCl (Zofran)  4 mg IVPUSH ONETIME PRN


   PRN Reason: Nausea/Vomiting


   Stop: 06/24/19 20:00


Oxycodone HCl (Oxycontin)  10 mg PO ONETIME [facility]


   Stop: 06/24/19 15:00


   Last Admin: 06/24/19 08:51 Dose:  10 mg


Oxycodone/Acetaminophen (Percocet 325-5 Mg)  1 - 2 tab PO Q4H PRN


   PRN Reason: Pain


   Last Admin: 06/25/19 11:22 Dose:  2 tab


Pregabalin (Lyrica)  50 mg PO ONETIME [facility]


   Stop: 06/24/19 15:00


   Last Admin: 06/24/19 08:51 Dose:  50 mg


Propofol (Diprivan  20 Ml) Confirm Administered Dose 200 mg .ROUTE .STK-MED ONE


   Stop: 06/24/19 09:07


Propofol (Diprivan  20 Ml) Confirm Administered Dose 200 mg .ROUTE .STK-MED ONE


   Stop: 06/24/19 10:23


Propofol (Diprivan  20 Ml) Confirm Administered Dose 200 mg .ROUTE .STK-MED ONE


   Stop: 06/24/19 11:04


Ropivacaine (Naropin 0.5%) Confirm Administered Dose 30 ml .ROUTE .STK-MED ONE


   Stop: 06/24/19 10:07


Senna (Senna)  8.6 mg PO BID PRN


   PRN Reason: Constipation


Simvastatin (Zocor)  40 mg PO DAILY [facility]


Sodium Chloride (Saline Flush)  10 ml FLUSH ASDIRECTED PRN


   PRN Reason: Keep Vein Open


   Stop: 06/24/19 18:00


Tranexamic Acid (Cyklokapron) Confirm Administered Dose 1,000 mg .ROUTE .STK-

MED ONE


   Stop: 06/24/19 08:57


   Last Admin: 06/24/19 11:12 Dose:  1,000 mg


Triamcinolone Acetonide (Kenalog-40) Confirm Administered Dose 80 mg .ROUTE .STK

-MED ONE


   Stop: 06/24/19 08:57


   Last Admin: 06/24/19 11:20 Dose:  80 mg


Vancomycin HCl (Vancomycin) Confirm Administered Dose 1 gm .ROUTE .STK-MED ONE


   Stop: 06/24/19 08:57


   Last Admin: 06/24/19 11:06 Dose:  1 gm

## 2019-06-28 NOTE — OR
DATE OF OPERATION:  06/24/2019

 

SURGEON:  Rich Cedeño MD

 

OPERATION PERFORMED:  Left total knee arthroplasty with right knee

corticosteroid injection.

 

PREOPERATIVE DIAGNOSIS:

Bilateral knee osteoarthrosis.

 

POSTOPERATIVE DIAGNOSIS:

Bilateral knee osteoarthrosis.

 

ANESTHESIA:

Local MAC with spinal.

 

ANESTHESIA PROVIDER:

Daylin Armenta CRNA

 

ASSISTANTS:

Penny Stewart PA-C and Saray Cruz LPN.

 

ESTIMATED BLOOD LOSS:

250 mL.

 

COMPLICATIONS:

None.

 

CONDITION:

Stable.

 

IMPLANTS:

1. Inglis size 4 press-fit CR femur.

2. Inglis size 4 press-fit tibial baseplate.

3. Hector size 4, 9 mm CS polyethylene insert.

4. Inglis size 32 x 10 mm press-fit asymmetric patella.

 

DESCRIPTION OF PROCEDURE:

The patient was identified in the preop holding area.  Proper site was marked

and identified by the surgeon.  The patient was taken back to the operating

theater.  After adequate anesthesia, the patient's left lower extremity had a

nonsterile tourniquet applied and it was sterilely prepped and draped in the

usual sterile fashion.  OR time-out was performed.  The patient received 2 g IV

Ancef.  At this time, the left lower extremity was exsanguinated.  Tourniquet

was insufflated to 300 mmHg.  Standard medial parapatellar incision was made.

Medial parapatellar arthrotomy was created.  Deep fibers of the MCL were raised

and anterior fat pad was resected.  At this time, attention was turned to the

patella.  Patella measured a 23, it was resected to a 13 for a 32 x 10 mm

patella.  Drill holes were then drilled and found to be in adequate position.

The drill was then drilled in the distal femur and the intramedullary distal

femoral cutting guide was then placed.  An 8 mm was resected off the distal

femur and was found to be an adequate resection.  Sizing guide was placed.  It

was found to be a size 4 press-fit CR femur that was shown on the implant record

at the beginning of this dictation.  The drill holes were drilled for the

epicondylar axis using Whitesides line and epicondyles as reference.  At this

time, the 4-in-1 cutting block was placed.  An anterior posterior and anterior

and posterior chamfer cuts were then completed.  Attention was turned to the

tibia.  The posterior medial lateral retractors were placed.  The extramedullary

tibial guide was placed.  It was placed in the old footprint of the ACL.  It was

aligned with the center of the ankle and 0 degrees of slope, 9 mm was then

resected off the unaffected side.  There was found to be an acceptable

reduction.  At this time, posterior osteophytes were removed along with medial

and lateral meniscus.  A trial implant was placed with a correct sized tibia

that was mentioned at the beginning of the dictation.  A Inglis size 4, 9 mm CS

polyethylene insert was then placed.  The patient's knee was brought through

range of motion.  The patella was tracking centrally and was stable to varus and

valgus stress.  Alignment was found to be roughly at 0 degrees.  The tibia was

stamped and drilled in proper rotation.  The universal tibial base plate was

impacted in place.  Next, the Hector size 4 press-fit CR femur impacted into

place and the Hector size 4, 9 mm CS polyethylene insert was placed.  The

patient's knee was brought into full extension.  The patella was then press-fit

in place at this time.  Tourniquet was deflated.  One liter dilute Betadine

solution was irrigated through the knee along with 3 L of pulse lavage

irrigation with Ancef.  Periarticular injection was then completed.  The

patient's knee was brought through a range of motion.  Knee was found to be 
stable to varus valgus stress, the patella was

tracking centrally with full range of motion.  At this time, a #2 barbed suture

was used for closure of the medial parapatellar arthrotomy.  Topical tranexamic

acid was placed.  2-0 Vicryl was used subcutaneously, Prineo was used for the

skin.  The patient tolerated the procedure well and was sent to the PACU in

stable condition.

 

After this was completed, under sterile technique, 2 mL of 40 mg Kenalog and 4

mL of 0.25% Marcaine were injected into the patient's right knee.  The patient

tolerated it well and will follow up in clinic in a week.

 

DD:  06/28/2019 09:52:49

DT:  06/28/2019 10:06:49  JOSEP

Job #:  [id number]

GABRIELA

## 2021-07-08 NOTE — [id number]
Post Anesthesia Note





- EVALUATION WITHIN 48HRS OF ANESTHETIC


Vital Signs in Normal Range: Yes


Patient Participated in Evaluation: Yes


Respiratory Function Stable: Yes


Airway Patent: Yes


Cardiovascular Function Stable: Yes


Hydration Status Stable: Yes


Pain Control Satisfactory: Yes


Nausea and Vomiting Control Satisfactory: Yes (just ate. no complaints)


Mental Status Recovered: Yes


Vital Signs: 


                                Last Vital Signs











Temp  97.1 F   07/08/21 11:00


 


Pulse  78   07/08/21 11:40


 


Resp  18   07/08/21 11:40


 


BP  128/72   07/08/21 11:40


 


Pulse Ox  98   07/08/21 11:40

## 2021-07-08 NOTE — PCM.POSTAN
POST ANESTHESIA ASSESSMENT





- MENTAL STATUS


Mental Status: Alert, Oriented





- VITAL SIGNS


Vital Signs: 


                                Last Vital Signs











Temp  97.3 F   07/08/21 10:17


 


Pulse  93  07/08/21  1017


 


Resp  12   07/08/21 10:17


 


BP  135/81   07/08/21 10:17


 


Pulse Ox  94 L  07/08/21 10:17














- RESPIRATORY


Respiratory Status: Respiratory Rate WNL, Airway Patent, O2 Saturation Stable, 

Supplemental Oxygen





- CARDIOVASCULAR


CV Status: Pulse Rate WNL, Blood Pressure Stable





- GASTROINTESTINAL


GI Status: No Symptoms





- PAIN


Pain Score: 0





- POST OP HYDRATION


Hydration Status: Adequate & Stable

## 2021-07-08 NOTE — CR
Right knee: AP and crosstable lateral views of the right knee were 

obtained.

 

Comparison: Previous right knee CT study of 06/24/21.

 

Knee prosthesis is seen.  Patellar prosthesis is also noted.  

Components are aligned.  Soft tissue air is seen.  Underlying bony 

structures show nothing acute.

 

Impression:

1.  Satisfactory post-op radiographic appearance of recently placed 

right knee prostheses.

 

Diagnostic code #2

## 2021-07-08 NOTE — PCM.SN.2
- Free Text/Narrative


Note: 





Right selective femoral nerve block at the adductor canal for post-procedure 

pain control under US guidance requested by Dr. Cedeño.


Date:  7/8/21


Time Out: 1030


Start: 1031


End: 1036





Chart reviewed.  Consent signed.  Questions answered. Appropriate monitors 

applied.  Time out performed. Right mid-shaft femur identified with ultrasound, 

scanning medially of femur, the femoral artery in the adductor canal visualized,

and the femoral nerve located laterally to the artery. The skin was prepped 

lateral to the ultrasound probe with chlorahexadine times two.  The 21ga 4 

insulated block needle was inserted under direct ultrasound guidance into the 

adductor canal.  25mL of 0.5% ropivacaine with 1:200,000 epinephrine was 

injected circumferentially around the nerve with intermittent negative 

aspiration noted.  Patient tolerated the procedure well.  Sterile technique 

noted along with sterile gloves, mask, and sterile probe cover.  See picture on 

progress note and vital signs on nurses notes.  Block completed in PACU.  


Martin Ruggiero CRNA

## 2021-07-08 NOTE — PCM.PREANE
Preanesthetic Assessment





- Procedure


Proposed Procedure: 





[facility] total knee arthroplasty





- Anesthesia/Transfusion/Family Hx


Anesthesia History: Prior Anesthesia Without Reaction


Family History of Anesthesia Reaction: No


Transfusion History: No Prior Transfusion(s)





- Review of Systems


General: No Symptoms


Pulmonary: Other (has sniffles- probably allergies)


Cardiovascular: No Symptoms


Gastrointestinal: No Symptoms


Neurological: No Symptoms


Other: Reports: Diabetes, Thyroid Problems, Sinus Problem





- Physical Assessment


NPO Status Date: 07/07/21


NPO Status Time: 23:00


Vital Signs: 





138/93


81


100%


18


96.8


Height: 5 ft 2 in


Weight: 80 kg


ASA Class: 2


Mental Status: Alert & Oriented x3


Airway Class: Mallampati = 2


Dentition: Reports: Partial (top)


Thyro-Mental Finger Breadths: 3


Mouth Opening Finger Breadths: 3


ROM/Head Extension: Full


Lungs: Clear to Auscultation, Normal Respiratory Effort


Cardiovascular: Regular Rate, Regular Rhythm





- Lab


Values: 





                             Laboratory Last Values











POC Glucose  115 mg/dL (70-99)  H  07/08/21  06:56    














- Allergies


Allergies/Adverse Reactions: 


                                    Allergies











Allergy/AdvReac Type Severity Reaction Status Date / Time


 


No Known Allergies Allergy   Verified 07/07/21 13:15














- Blood


Blood Available: No





- Acknowledgements


Anesthesia Type Planned: Spinal


Pt an Appropriate Candidate for the Planned Anesthesia: Yes


Alternatives and Risks of Anesthesia Discussed w Pt/Guardian: Yes


Pt/Guardian Understands and Agrees with Anesthesia Plan: Yes





PreAnesthesia Questionnaire


HEENT History: Reports: Impaired Vision, Other (See Below)


Other HEENT History: wears glasses, has partial


Cardiovascular History: Reports: High Cholesterol


Respiratory History: Reports: None


Gastrointestinal History: Reports: None


Genitourinary History: Reports: None


OB/GYN History: Reports: None


Musculoskeletal History: Reports: Osteoarthritis


Neurological History: Reports: None


Psychiatric History: Reports: None


Endocrine/Metabolic History: Reports: Diabetes, Type II, Hypothyroidism, 

Obesity/BMI 30+


Hematologic History: Reports: None


Immunologic History: Reports: None


Oncologic (Cancer) History: Reports: None


Dermatologic History: Reports: Eczema





- Infectious Disease History


Infectious Disease History: Reports: None





- Past Surgical History


Head Surgeries/Procedures: Reports: None


Cardiovascular Surgical History: Reports: None


Respiratory Surgical History: Reports: None


GI Surgical History: Reports: Cholecystectomy


Female  Surgical History: Reports: None


Male  Surgical History: Reports: None


Endocrine Surgical History: Reports: None


Neurological Surgical History: Reports: None


Musculoskeletal Surgical History: Reports: Knee Replacement, Shoulder Surgery, 

Other (See Below)


Other Musculoskeletal Surgeries/Procedures:: knee arthroscopy, decompression of 

medial nerve


Oncologic Surgical History: Reports: None


Dermatological Surgical History: Reports: None





- SUBSTANCE USE


Tobacco Use Status *Q: Former Tobacco User


Tobacco Use Within Last Twelve Months: Cigarettes


Second Hand Smoke Exposure: No


Days Per Week of Alcohol Use: 1


Recreational Drug Use History: No





- HOME MEDS


Home Medications: 


                                    Home Meds





Cholecalciferol (Vitamin D3) [Vitamin D3] 5,000 unit PO DAILY 06/21/19 [History]


Multivitamin [Daily Multiple Vitamin] 1 tab PO DAILY 06/21/19 [History]


Simvastatin [Zocor] 40 mg PO DAILY 06/21/19 [History]


metFORMIN HCl [Metformin HCl ER] 500 mg PO DAILY 06/21/19 [History]


Aspirin [Aspirin EC] 325 mg PO BID #84 tab 07/07/21 [Rx]


Calcium Carbonate/Vitamin D3 [Calcium 600-Vit D3 400 Tablet] 1 tab PO DAILY 

07/07/21 [History]


Cyclobenzaprine [Flexeril] 10 mg PO BID PRN #20 tab 07/07/21 [Rx]


Fish Oil/Borage/Flax/Om3,6,9 1 [Omega 3-6-9 1,200 mg Softgel] 1,200 mg PO DAILY 

07/07/21 [History]


Levothyroxine Sodium [Levothyroxine] 137 mcg PO DAILY 07/07/21 [History]


oxyCODONE 5 - 10 mg PO Q4H PRN #40 tab 07/07/21 [Rx]











- CURRENT (IN HOUSE) MEDS


Current Meds: 





                               Current Medications





Acetaminophen (Acetaminophen 325 Mg Tab)  975 mg PO ONETIME AYAKA


   Stop: 07/08/21 16:00


Morphine Sulfate 8 mg/Epinephrine HCl 0.3 mg/Cefuroxime Sodium 750 mg/Ketorolac 

Tromethamine 30 mg/Sodium Chloride 7.9 ml  0 mg .XX ASDIRECTED PRN


   PRN Reason: Pain


   Stop: 07/08/21 18:00


Lactated Ringer's (Ringers, Lactated)  1,000 mls @ 125 mls/hr IV ASDIRECTED AYAKA


   Stop: 07/08/21 23:00


Lidocaine/Sodium Bicarbonate (Lidocaine 1%/Sod Bicarbonate In Ns 8.4% 1 Ml 

Syringe)  0.25 ml IDERM ONETIME PRN


   PRN Reason: Prior to IV Start


   Stop: 07/08/21 18:00


Oxycodone HCl (Oxycodone Er 10 Mg Tab.Er)  10 mg PO ONETIME AYAKA


   Stop: 07/08/21 16:00


Pregabalin (Pregabalin 25 Mg Cap)  50 mg PO ONETIME AYAKA


   Stop: 07/08/21 16:00


Sodium Chloride (Sodium Chloride 0.9% 10 Ml Syringe)  10 ml FLUSH ASDIRECTED PRN


   PRN Reason: Keep Vein Open


   Stop: 07/08/21 18:00





Discontinued Medications





Epinephrine HCl (Epinephrine 1 Mg/Ml Sdv) Confirm Administered Dose 1 mg .ROUTE 

.STK-MED ONE


   Stop: 07/08/21 07:09


Fentanyl (Fentanyl 250 Mcg/5 Ml Sdv) Confirm Administered Dose 250 mcg .ROUTE 

.STK-MED ONE


   Stop: 07/07/21 09:36


Lidocaine HCl (Xylocaine-Mpf 1%) Confirm Administered Dose 4 mls @ as directed 

.ROUTE .STK-MED ONE


   Stop: 07/07/21 09:37


Midazolam HCl (Midazolam 1 Mg/Ml 2 Ml Sdv) Confirm Administered Dose 2 mg .ROUTE

 .STK-MED ONE


   Stop: 07/07/21 09:36


Propofol (Propofol 200 Mg/20 Ml Sdv) Confirm Administered Dose 200 mg .ROUTE 

.STK-MED ONE


   Stop: 07/07/21 09:36


Rocuronium Bromide (Rocuronium 50 Mg/5 Ml Vial) Confirm Administered Dose 50 mg 

.ROUTE .STK-MED ONE


   Stop: 07/07/21 09:39


Ropivacaine (Ropivacaine 0.5% 5 Mg/Ml 30 Ml Sdv) Confirm Administered Dose 30 ml

 .ROUTE .STK-MED ONE


   Stop: 07/08/21 07:09

## 2021-07-20 NOTE — OR
DATE OF OPERATION:  07/08/2021

 

SURGEON:  Rich Cedeño MD

 

OPERATION PERFORMED:  Right total knee arthroplasty with [employer] robotics.

 

PREOPERATIVE DIAGNOSIS:

Right knee osteoarthrosis.

 

POSTOPERATIVE DIAGNOSIS:

Right knee osteoarthrosis.

 

ANESTHESIA:

Local MAC with spinal.

 

ANESTHESIA PROVIDER:

Martin Ruggiero CRNA

 

ASSISTANT:

Penny Stewart PA-C; and Saray Cruz LPN.

 

ESTIMATED BLOOD LOSS:

150 mL.

 

COMPLICATIONS:

None.

 

CONDITION:

Stable.

 

IMPLANTS:

1. Oscar size 4 press-fit CR femur.

2. Hector size 4 press-fit tibial baseplate.

3. Oscar size 4 9 mm CS polyethylene insert.

4. Oscar size 32 x 10 mm press-fit asymmetric patella.

 

DESCRIPTION OF PROCEDURE:

The patient was identified in the preoperative holding area.  Proper site was

marked and identified by the surgeon.  The patient was taken back to the

operative theater, where after adequate anesthesia, the patient had a nonsterile

tourniquet applied to the right lower extremity.  Right lower extremity was then

sterilely prepped and draped in the usual sterile fashion.  OR time-out was

performed.  The patient received 2 g IV Ancef.  Right lower extremity was

exsanguinated.  Tourniquet was insufflated to 250 mmHg.  Standard anterior

incision was made and medial parapatellar arthrotomy was created.  Deep fibers

of the MCL were raised and anterior fat pad was resected.  Attention was turned

to the patella.  Patella measured 24 and was resected to a 14 for a 32 x 10 mm

press-fit patella.  Drill holes were then drilled and found to have adequate

bone.  At this time, attention was turned to the femur.  Two 4.0 Schanz pins

were placed intra-incisionally in the femur for the Hector Neftali robotic array.

Two more were placed in the tibia 3 fingerbreadths below the tibial tubercle for

the Hector Neftali robotic array.  Checkpoints were placed on the femur.  Hip

center of the rotation was then obtained.  Medial and lateral malleoli were

marked as well as the femoral and tibial checkpoint.  40 points were then

obtained off both the femur and the tibia with Hector Neftali robotic plan.  The

patient's knee was brought into full extension.  Varus and valgus stresses were

applied as well as 90 degrees of flexion.  The patient then had concentric 19 mm

flexion and extension gaps using the Oscar Neftali robotic plan.  Straight saw

blade was brought in.  The tibia anterior femoral cut, anterior chamfer cut, and

posterior femoral cut were then completed.  Saw blade was then switched and the

posterior chamfer and distal femoral cut were completed.  Bony fragments were

removed.  Medial and lateral meniscus were then resected.  Posterior osteophytes

were removed.  A size 4 trial base plate was then placed as well as a size 4

trial femur.  9 mm trial spacer was placed.  The patient's knee was brought to

full extension.  She had no varus-valgus instability.  Full extension and full

flexion with no signs of liftoff.  The patella was tracking centrally at this

time.  The femoral drill holes were drilled.  Tibia was stamped and drilled in

proper rotation.  Trial implants were then removed.  The size 4 press-fit tibia

was then impacted into place.  Size 4 press-fit femur was impacted into place.

9 mm CS polyethylene insert was impacted into place.  The patient's knee was

brought to full extension at 32 x 10 mm patella, was press-fit into place.

Tourniquet was deflated.  Bleeders were then cauterized.  Periarticular

injection was completed.  1 L of pulse lavage irrigation with Ancef was

irrigated in the knee along with 400 mL of IrriSept irrigation.  Topical

tranexamic acid and vancomycin powder were applied.  A #2 barbed suture was used

for closure of the medial parapatellar arthrotomy.  2-0 Vicryl was used

subcutaneously, and Prineo was used for skin closure.  The patient tolerated the

procedure well, had a sterile soft dressing applied, and sent to the PACU in

stable condition.

 

DD:  07/20/2021 07:20:13

DT:  07/20/2021 07:44:18  JOSEP

Job #:  [id number]

## 2021-07-20 NOTE — PCM.OPNOTE
- General Post-Op/Procedure Note


Date of Surgery/Procedure: 07/08/21


Operative Procedure(s): right total knee arthroplasty with [employer] robot[employer]


Pre Op Diagnosis: right knee osteoarthrosis


Post-Op Diagnosis: Same


Anesthesia Technique: Local, MAC, Spinal


Primary Surgeon: Rich Cedeño


Anesthesia Provider: Martin Ruggiero


Assistant: Penny Stewart


Assistant: Saray Cruz


EBL in mLs: 150


Complications: None


Condition: Good


Free Text/Narrative:: 


4/4


9mm


32x10